# Patient Record
Sex: MALE | Race: WHITE | NOT HISPANIC OR LATINO | Employment: STUDENT | URBAN - METROPOLITAN AREA
[De-identification: names, ages, dates, MRNs, and addresses within clinical notes are randomized per-mention and may not be internally consistent; named-entity substitution may affect disease eponyms.]

---

## 2022-09-27 ENCOUNTER — OFFICE VISIT (OUTPATIENT)
Dept: FAMILY MEDICINE CLINIC | Facility: CLINIC | Age: 22
End: 2022-09-27

## 2022-09-27 VITALS
HEART RATE: 82 BPM | TEMPERATURE: 98.6 F | WEIGHT: 170 LBS | BODY MASS INDEX: 23.03 KG/M2 | HEIGHT: 72 IN | SYSTOLIC BLOOD PRESSURE: 118 MMHG | OXYGEN SATURATION: 98 % | DIASTOLIC BLOOD PRESSURE: 70 MMHG

## 2022-09-27 DIAGNOSIS — Z51.81 THERAPEUTIC DRUG MONITORING: ICD-10-CM

## 2022-09-27 DIAGNOSIS — F90.9 ATTENTION DEFICIT HYPERACTIVITY DISORDER (ADHD), UNSPECIFIED ADHD TYPE: Primary | ICD-10-CM

## 2022-09-27 PROCEDURE — 99204 OFFICE O/P NEW MOD 45 MIN: CPT | Performed by: FAMILY MEDICINE

## 2022-09-27 NOTE — PROGRESS NOTES
New Patient Office Visit      Patient Name: Avery Coffman  : 2000   MRN: [id number]     Chief Complaint:    Chief Complaint   Patient presents with   • ADHD       History of Present Illness: Avery Coffman is a 22 y.o. male who is here today to establish care.  adhd is is main concern. Was on higher does 40mg and 30mg. Now on 20mg. Diagnosed at age 17. 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     Patient has been off Vyvanse for 3 months then he says school is fairly tough without it.  He has trouble focusing.  He like to restart the 20 mg.    Review of systems was negative for shortness of breath and chest pain      Physical exam: Patient's mood and affect was appropriate.  Lung exam CTA bilaterally.  Heart exam RRR.        Subjective          Past Medical History: History reviewed. No pertinent past medical history.    Past Surgical History: History reviewed. No pertinent surgical history.    Family History:   Family History   Problem Relation Age of Onset   • Cancer Mother    • Thyroid disease Mother    • Asthma Sister    • Diabetes Maternal Grandmother    • Hearing loss Maternal Grandfather    • Hypertension Paternal Grandfather    • Hyperlipidemia Paternal Grandfather    • Heart disease Paternal Grandfather        Social History:   Social History     Socioeconomic History   • Marital status: Single   Tobacco Use   • Smoking status: Current Some Day Smoker     Packs/day: 0.25     Years: 6.00     Pack years: 1.50     Types: Cigarettes   • Smokeless tobacco: Never Used   Substance and Sexual Activity   • Alcohol use: Yes     Alcohol/week: 8.0 standard drinks     Types: 4 Glasses of wine, 4 Shots of liquor per week   • Drug use: Never   • Sexual activity: Yes     Partners: Female       Medications:     Current Outpatient Medications:   •  lisdexamfetamine (Vyvanse) 20 MG capsule, Take 1 capsule by mouth Every Morning Call provider for refills, Disp: 30 capsule, Rfl: 0    Allergies:   Allergies   Allergen  "Reactions   • Dairy-Digestive [Lactase] GI Intolerance   • Gluten Meal GI Intolerance       Objective     Physical Exam: Please see above  Vital Signs:   Vitals:    09/27/22 1345   BP: 118/70   Pulse: 82   Temp: 98.6 °F (37 °C)   SpO2: 98%   Weight: 77.1 kg (170 lb)   Height: 182.9 cm (72\")     Body mass index is 23.06 kg/m².       Assessment / Plan      Assessment/Plan:   Diagnoses and all orders for this visit:    1. Attention deficit hyperactivity disorder (ADHD), unspecified ADHD type (Primary)  -     lisdexamfetamine (Vyvanse) 20 MG capsule; Take 1 capsule by mouth Every Morning Call provider for refills  Dispense: 30 capsule; Refill: 0    2. Therapeutic drug monitoring  -     Compliance Drug Analysis, Ur - Urine, Clean Catch         1. Will restart Vyvanse at 20 mg.  Patient reportedly been on it for 5 years.  Has stopped it within the last 3 to 4 months.  Have worsening symptoms so we will restart.  Discussed her contract,.  Did YAMILETH Moy reviewed.      Follow-up every 3 months and annual exam will be in 9 months    Follow Up:   Return in about 3 months (around 12/27/2022) for Recheck.    Edwin Mccall,   [facility] [place]     "

## 2022-10-04 LAB — DRUGS UR: NORMAL

## 2023-04-06 DIAGNOSIS — F90.9 ATTENTION DEFICIT HYPERACTIVITY DISORDER (ADHD), UNSPECIFIED ADHD TYPE: ICD-10-CM

## 2023-04-06 NOTE — TELEPHONE ENCOUNTER
Caller: Avery Coffman    Relationship: Self    Best call back number: [phone]    Requested Prescriptions:   Requested Prescriptions     Pending Prescriptions Disp Refills   • lisdexamfetamine (Vyvanse) 20 MG capsule 30 capsule 0     Sig: Take 1 capsule by mouth Every Morning Call provider for refills      Pharmacy where request should be sent: [facility] [id number] [address]  AT [facility] & [facility] [address] [place] [address] - [phone]  - [phone] FX     Last office visit with prescribing clinician: 9/27/2022   Last telemedicine visit with prescribing clinician: Visit date not found   Next office visit with prescribing clinician: Visit date not found     Does the patient have less than a 3 day supply:  [x] Yes  [] No    Would you like a call back once the refill request has been completed: [x] Yes [] No    If the office needs to give you a call back, can they leave a voicemail: [x] Yes [] No    Westley Velez Rep   04/06/23 10:15 EDT

## 2023-04-06 NOTE — TELEPHONE ENCOUNTER
Vyvanse is a controlled substance.  Per state law, it is required that you follow-up every 3 months for refills.  For any further refills, please follow-up with our office.

## 2023-04-07 NOTE — TELEPHONE ENCOUNTER
HUB TO READ    CONTACTED PATIENT TO LET HIM KNOW AN APPOINTMENT WILL NEED TO BE MADE IN ORDER TO GET REFILLS.  PATIENT DID NOW ANSWER AND VOICEMAIL WAS NOT SET UP.

## 2023-04-10 ENCOUNTER — OFFICE VISIT (OUTPATIENT)
Dept: FAMILY MEDICINE CLINIC | Facility: CLINIC | Age: 23
End: 2023-04-10
Payer: COMMERCIAL

## 2023-04-10 VITALS
BODY MASS INDEX: 23.7 KG/M2 | TEMPERATURE: 98.6 F | DIASTOLIC BLOOD PRESSURE: 70 MMHG | HEART RATE: 85 BPM | WEIGHT: 175 LBS | OXYGEN SATURATION: 99 % | SYSTOLIC BLOOD PRESSURE: 114 MMHG | HEIGHT: 72 IN

## 2023-04-10 DIAGNOSIS — F90.9 ATTENTION DEFICIT HYPERACTIVITY DISORDER (ADHD), UNSPECIFIED ADHD TYPE: Primary | ICD-10-CM

## 2023-04-10 DIAGNOSIS — Z51.81 THERAPEUTIC DRUG MONITORING: ICD-10-CM

## 2023-04-10 NOTE — PROGRESS NOTES
"     Follow Up Office Visit      Patient Name: Avery Coffman  : 2000   MRN: [id number]     Chief Complaint:    Chief Complaint   Patient presents with   • ADHD       History of Present Illness: Avery Coffman is a 22 y.o. male who is here today to follow up with ADHD.  Patient reports following Ritalin use when he was around age 16.  He has been diagnosed with ADHD for a while.  Patient says that he been on Vyvanse mostly.  Patient says that this has worked well for him.  He says he had side effects with the higher dose of Vyvanse.  He is currently studying homeland security.    Patient wants to restart his Vyvanse because he is back in school having trouble concentrating.  Patient says that the reason he stopped it last Blanca was that he was not taking classes.    Review of systems positive for inattention    Physical exam: Patient's mood and affect is appropriate      Subjective        I have reviewed and the following portions of the patient's history were updated as appropriate: past family history, past medical history, past social history, past surgical history and problem list.    Medications:     Current Outpatient Medications:   •  lisdexamfetamine (Vyvanse) 20 MG capsule, Take 1 capsule by mouth Every Morning, Disp: 30 capsule, Rfl: 0    Allergies:   Allergies   Allergen Reactions   • Dairy-Digestive [Tilactase] GI Intolerance   • Gluten Meal GI Intolerance       Objective     Physical Exam: Please see above  Vital Signs:   Vitals:    04/10/23 1306   BP: 114/70   Pulse: 85   Temp: 98.6 °F (37 °C)   SpO2: 99%   Weight: 79.4 kg (175 lb)   Height: 182.9 cm (72\")     Body mass index is 23.73 kg/m².          Assessment / Plan      Assessment/Plan:   Diagnoses and all orders for this visit:    1. Attention deficit hyperactivity disorder (ADHD), unspecified ADHD type (Primary)  -     lisdexamfetamine (Vyvanse) 20 MG capsule; Take 1 capsule by mouth Every Morning  Dispense: 30 capsule; Refill: 0    2. " Therapeutic drug monitoring  -     Compliance Drug Analysis, Ur - Urine, Clean Catch    Patient reporting previous failure of Ritalin in his remote past.  Unsure of dates used.  Patient reporting history of Vyvanse use, this is continuation of treatment.  Restart Vyvanse 20 mg.  Increase as needed.  Follow-up monthly for medication changes and every 3 months for follow-up otherwise.  Drug contract and UDS discussed today    Today we reviewed and discussed the patient's controlled substance.  We reviewed their [place] report, previous drug screens, and drug contract.  They have a drug contract and drug screen on file that is current.  They are compliant with follow-ups every 3 months, and will continue to be compliant per the drug contract.    Follow-up in 3 months for refills or in 1 month to discuss medication changes needed    Follow Up:   Return in about 3 months (around 7/10/2023) for Recheck.    Edwin Mccall DO  [facility] [place] [facility] [place]

## 2023-04-17 LAB — DRUGS UR: NORMAL

## 2023-05-08 DIAGNOSIS — F90.9 ATTENTION DEFICIT HYPERACTIVITY DISORDER (ADHD), UNSPECIFIED ADHD TYPE: ICD-10-CM

## 2023-05-08 NOTE — TELEPHONE ENCOUNTER
Caller: Avery Coffman    Relationship: Self    Best call back number: [phone]    Requested Prescriptions:   Requested Prescriptions     Pending Prescriptions Disp Refills   • lisdexamfetamine (Vyvanse) 20 MG capsule 30 capsule 0     Sig: Take 1 capsule by mouth Every Morning        Pharmacy where request should be sent: [facility] [id number] [address]  AT [facility] & [facility] [place] [address] - [phone]  - [phone] FX     Last office visit with prescribing clinician: 4/10/2023   Last telemedicine visit with prescribing clinician: 6/12/2023   Next office visit with prescribing clinician: 6/12/2023     Additional details provided by patient: PATIENT HAS 2 LEFT. PATIENT IS GOING OUT OF TOWN EARLY TOMORROW. IF POSSIBLE PATIENT WOULD LIKE THIS FILLED TODAY 05.08.23.     Does the patient have less than a 3 day supply:  [x] Yes  [] No    Would you like a call back once the refill request has been completed: [x] Yes [] No    If the office needs to give you a call back, can they leave a voicemail: [] Yes [] No    Westley Hannah Rep   05/08/23 09:45 EDT

## 2023-06-12 ENCOUNTER — TELEPHONE (OUTPATIENT)
Dept: FAMILY MEDICINE CLINIC | Facility: CLINIC | Age: 23
End: 2023-06-12

## 2023-06-15 ENCOUNTER — OFFICE VISIT (OUTPATIENT)
Dept: FAMILY MEDICINE CLINIC | Facility: CLINIC | Age: 23
End: 2023-06-15
Payer: COMMERCIAL

## 2023-06-15 VITALS
HEIGHT: 72 IN | BODY MASS INDEX: 22.75 KG/M2 | TEMPERATURE: 98.2 F | HEART RATE: 55 BPM | WEIGHT: 168 LBS | SYSTOLIC BLOOD PRESSURE: 112 MMHG | DIASTOLIC BLOOD PRESSURE: 70 MMHG

## 2023-06-15 DIAGNOSIS — F90.9 ATTENTION DEFICIT HYPERACTIVITY DISORDER (ADHD), UNSPECIFIED ADHD TYPE: Primary | ICD-10-CM

## 2023-06-15 PROCEDURE — 99214 OFFICE O/P EST MOD 30 MIN: CPT | Performed by: FAMILY MEDICINE

## 2023-06-15 RX ORDER — DEXTROAMPHETAMINE SACCHARATE, AMPHETAMINE ASPARTATE MONOHYDRATE, DEXTROAMPHETAMINE SULFATE AND AMPHETAMINE SULFATE 7.5; 7.5; 7.5; 7.5 MG/1; MG/1; MG/1; MG/1
30 CAPSULE, EXTENDED RELEASE ORAL EVERY MORNING
Qty: 30 CAPSULE | Refills: 0 | Status: SHIPPED | OUTPATIENT
Start: 2023-06-15

## 2023-06-15 NOTE — PROGRESS NOTES
"     Follow Up Office Visit      Patient Name: Avery Coffman  : 2000   MRN: [id number]     Chief Complaint:    Chief Complaint   Patient presents with    ADHD       History of Present Illness: Avery Coffman is a 23 y.o. male who is here today to follow up with adhd.  Patient has been on Vyvanse before and says it is costing $80 per month.  First prescription was $50.  Patient reports his mother says this is fairly cheap compared to the original price of 400.  Patient has never had Adderall.  He has failed Ritalin.  Patient is willing to try Adderall as it may be a cheaper alternative.      Otherwise patient is doing very well on Vyvanse and just needs refill today.  We discussed switching him to Adderall for pricing      Physical exam: Patient's mood and affect is appropriate      Subjective        I have reviewed and the following portions of the patient's history were updated as appropriate: past family history, past medical history, past social history, past surgical history and problem list.    Medications:     Current Outpatient Medications:     amphetamine-dextroamphetamine XR (Adderall XR) 30 MG 24 hr capsule, Take 1 capsule by mouth Every Morning, Disp: 30 capsule, Rfl: 0    Allergies:   Allergies   Allergen Reactions    Dairy-Digestive [Tilactase] GI Intolerance    Gluten Meal GI Intolerance       Objective     Physical Exam: Please see above  Vital Signs:   Vitals:    06/15/23 1014   BP: 112/70   Pulse: 55   Temp: 98.2 °F (36.8 °C)   Weight: 76.2 kg (168 lb)   Height: 182.9 cm (72\")     Body mass index is 22.78 kg/m².          Assessment / Plan      Assessment/Plan:   Diagnoses and all orders for this visit:    1. Attention deficit hyperactivity disorder (ADHD), unspecified ADHD type (Primary)  -     amphetamine-dextroamphetamine XR (Adderall XR) 30 MG 24 hr capsule; Take 1 capsule by mouth Every Morning  Dispense: 30 capsule; Refill: 0    Stop Vyvanse and start Adderall.  Follow-up in 1 month " for recheck.  Follow-up in 3 months for annual exam.      Today we reviewed and discussed the patient's controlled substance.  We reviewed their [place] report, previous drug screens, and drug contract.  They have a drug contract and drug screen on file that is current.  They are compliant with follow-ups every 3 months, and will continue to be compliant per the drug contract.     Follow Up:   Return for Annual, Recheck.    Edwin Mccall DO  [facility] [place] [place]

## 2023-06-19 ENCOUNTER — TELEPHONE (OUTPATIENT)
Dept: FAMILY MEDICINE CLINIC | Facility: CLINIC | Age: 23
End: 2023-06-19

## 2023-06-19 NOTE — TELEPHONE ENCOUNTER
Caller: Avery Coffman    Relationship: Self    Best call back number: [phone]     What is the best time to reach you: ANYTIME    Who are you requesting to speak with (clinical staff, provider,  specific staff member): DR GIL    What was the call regarding: PATIENT IS HAVING PRESCRIPTION ISSUES

## 2023-07-25 ENCOUNTER — TELEPHONE (OUTPATIENT)
Dept: FAMILY MEDICINE CLINIC | Facility: CLINIC | Age: 23
End: 2023-07-25

## 2023-07-25 NOTE — TELEPHONE ENCOUNTER
Caller: Avery Coffman    Relationship: Self    Best call back number: [phone]    What medications are you currently taking:   Current Outpatient Medications on File Prior to Visit   Medication Sig Dispense Refill    lisdexamfetamine (Vyvanse) 20 MG capsule Take 1 capsule by mouth Every Morning Call provider for refills 30 capsule 0     No current facility-administered medications on file prior to visit.          Which medication are you concerned about: VMANOHAR    Who prescribed you this medication: DR GIL    What are your concerns: PATIENT STATES THAT EVEN THOUGH THE REFILL WAS SENT TO PHARMACY HE STILL NEEDS A WRITTEN PRESCRIPTION TO TAKE TO PHARMACY IN ORDER TO  MEDICATION. PLEASE ADVISE

## 2023-07-25 NOTE — TELEPHONE ENCOUNTER
I called and spoke with the pharamcy and they do not need a written rx. I called the pt and advised him. He voiced understanding.

## 2023-10-30 ENCOUNTER — TELEPHONE (OUTPATIENT)
Dept: FAMILY MEDICINE CLINIC | Facility: CLINIC | Age: 23
End: 2023-10-30

## 2024-09-04 ENCOUNTER — OFFICE VISIT (OUTPATIENT)
Dept: FAMILY MEDICINE CLINIC | Facility: CLINIC | Age: 24
End: 2024-09-04
Payer: COMMERCIAL

## 2024-09-04 VITALS
RESPIRATION RATE: 19 BRPM | TEMPERATURE: 98 F | HEART RATE: 71 BPM | WEIGHT: 168 LBS | HEIGHT: 71 IN | BODY MASS INDEX: 23.52 KG/M2 | OXYGEN SATURATION: 98 % | SYSTOLIC BLOOD PRESSURE: 100 MMHG | DIASTOLIC BLOOD PRESSURE: 66 MMHG

## 2024-09-04 DIAGNOSIS — Z79.899 HIGH RISK MEDICATION USE: Primary | ICD-10-CM

## 2024-09-04 PROCEDURE — 99213 OFFICE O/P EST LOW 20 MIN: CPT | Performed by: NURSE PRACTITIONER

## 2024-09-04 NOTE — PROGRESS NOTES
Office Note     Name: Avery Coffman    : 2000     MRN: [id number]     Chief Complaint  ADHD    Subjective     History of Present Illness:  Avery Coffman is a 24 y.o. male who presents today for a refill of his Vyvanse. He reports a diagnosis of ADHD. He states he is doing well on the Vyvanse, has increased concentration. He denies chest pain, shortness of breath, irregular or fast heart rate. He is sleeping well. No significant weight loss. He is currently attending college and the Vyvanse helps him focus on his school work. He will graduate this coming year. The patient requests a refill of the Vyvanse.      Review of Systems:   Review of Systems    Past Medical History: History reviewed. No pertinent past medical history.    Past Surgical History: History reviewed. No pertinent surgical history.    Immunizations:   Immunization History   Administered Date(s) Administered    COVID-19 (PFIZER) Purple Cap Monovalent 2022    Covid-19 (Pfizer) Gray Cap Monovalent 2022    DTaP, Unspecified 2000, 2000, 2000, 2001, 2004    Hep A, 2 Dose 2010, 2011    Hep B, Unspecified 2000, 2000, 2000    Hepatitis A Pediatric Unspecified 2010, 2011    HiB 2000, 2000, 2000, 2001    IPV 2000, 2000, 2001, 2004    MMR 2001, 2004    Meningococcal, Unspecified 2011, 2016    PPD Test 2001, 2003, 2004, 2005, 06/15/2007, 2008, 2009    Pneumococcal, Unspecified 2000, 2000, 2001, 2001    Tdap 2011, 02/10/2022    Varicella 2001, 2010        Medications:     Current Outpatient Medications:     lisdexamfetamine (Vyvanse) 20 MG capsule, Take 1 capsule by mouth Every Morning Call provider for refills, Disp: 30 capsule, Rfl: 0    Allergies:   Allergies   Allergen Reactions    Dairy-Digestive [Tilactase] GI  "Intolerance    Gluten Meal GI Intolerance       Family History:   Family History   Problem Relation Age of Onset    Cancer Mother     Thyroid disease Mother     Asthma Sister     Diabetes Maternal Grandmother     Hearing loss Maternal Grandfather     Hypertension Paternal Grandfather     Hyperlipidemia Paternal Grandfather     Heart disease Paternal Grandfather        Social History:   Social History     Socioeconomic History    Marital status: Single   Tobacco Use    Smoking status: Some Days     Current packs/day: 0.25     Average packs/day: 0.3 packs/day for 6.0 years (1.5 ttl pk-yrs)     Types: Cigarettes     Passive exposure: Current    Smokeless tobacco: Never   Vaping Use    Vaping status: Some Days    Substances: Nicotine, Flavoring    Devices: Disposable    Passive vaping exposure: Yes   Substance and Sexual Activity    Alcohol use: Yes     Alcohol/week: 8.0 standard drinks of alcohol     Types: 4 Glasses of wine, 4 Shots of liquor per week    Drug use: Never    Sexual activity: Yes     Partners: Female         Objective     Vital Signs  /66 (BP Location: Right arm, Patient Position: Sitting, Cuff Size: Adult)   Pulse 71   Temp 98 °F (36.7 °C) (Infrared)   Resp 19   Ht 179.7 cm (70.75\")   Wt 76.2 kg (168 lb)   SpO2 98%   BMI 23.60 kg/m²   Estimated body mass index is 23.6 kg/m² as calculated from the following:    Height as of this encounter: 179.7 cm (70.75\").    Weight as of this encounter: 76.2 kg (168 lb).    BMI is within normal parameters. No other follow-up for BMI required.      Physical Exam  Vitals and nursing note reviewed.   Constitutional:       General: He is not in acute distress.     Appearance: Normal appearance. He is not ill-appearing or toxic-appearing.   HENT:      Head: Normocephalic and atraumatic.   Cardiovascular:      Rate and Rhythm: Normal rate and regular rhythm.      Heart sounds: Normal heart sounds.   Pulmonary:      Effort: Pulmonary effort is normal.      " Breath sounds: Normal breath sounds.   Musculoskeletal:         General: Normal range of motion.      Cervical back: Normal range of motion and neck supple.   Skin:     General: Skin is warm.   Neurological:      General: No focal deficit present.      Mental Status: He is alert.   Psychiatric:         Mood and Affect: Mood normal.         Behavior: Behavior normal.         Thought Content: Thought content normal.         Judgment: Judgment normal.          Assessment and Plan     Procedures      Lab Results (last 72 hours)       ** No results found for the last 72 hours. **                  Diagnoses and all orders for this visit:    1. High risk medication use (Primary)  -     Cancel: Urine Drug Screen - Urine, Clean Catch; Future  -     Cancel: Urine Drug Screen - Urine, Clean Catch  -     Compliance Drug Analysis, Ur - Urine, Clean Catch; Future  -     Compliance Drug Analysis, Ur - Urine, Clean Catch    Requests for medication refill sent to Dr. Mccall. Routine urine drug screen obtained during clinic visit. Patient instructed to schedule an appointment in three months with Dr. Mccall.        Follow Up  Return in about 3 months (around 12/4/2024).    ELO Alfaro [facility]  [address] [place] [id number] [phone]

## 2024-09-04 NOTE — Clinical Note
The patient requests a refill of Vyvanse. I did a urine drug screen, awaiting results. I instructed him to schedule an appointment in three months.

## 2024-09-05 DIAGNOSIS — F90.9 ATTENTION DEFICIT HYPERACTIVITY DISORDER (ADHD), UNSPECIFIED ADHD TYPE: ICD-10-CM

## 2024-09-05 RX ORDER — LISDEXAMFETAMINE DIMESYLATE 20 MG/1
20 CAPSULE ORAL EVERY MORNING
Qty: 30 CAPSULE | Refills: 0 | Status: SHIPPED | OUTPATIENT
Start: 2024-09-05

## 2024-09-05 NOTE — TELEPHONE ENCOUNTER
Caller: Avery Coffman    Relationship: Self    Best call back number: [phone]     Requested Prescriptions:   Requested Prescriptions     Pending Prescriptions Disp Refills    lisdexamfetamine (Vyvanse) 20 MG capsule 30 capsule 0     Sig: Take 1 capsule by mouth Every Morning Call provider for refills        Pharmacy where request should be sent: [facility] [id number] [address]  AT [facility] & [facility] [place] [address] - [phone]  - [phone] FX     Last office visit with prescribing clinician: 6/15/2023   Last telemedicine visit with prescribing clinician: Visit date not found   Next office visit with prescribing clinician: 12/6/2024     Additional details provided by patient: PATIENT STATED THAT HE WAS SEEN ON 9/4/24 AND THIS WAS NOT CALLED IN AT HIS APPOINTMENT     Does the patient have less than a 3 day supply:  [x] Yes  [] No    Would you like a call back once the refill request has been completed: [x] Yes [] No    If the office needs to give you a call back, can they leave a voicemail: [x] Yes [] No    Westley Young Rep   09/05/24 11:29 EDT

## 2024-09-10 LAB — DRUGS UR: NORMAL

## 2024-10-08 DIAGNOSIS — F90.9 ATTENTION DEFICIT HYPERACTIVITY DISORDER (ADHD), UNSPECIFIED ADHD TYPE: ICD-10-CM

## 2024-10-08 RX ORDER — LISDEXAMFETAMINE DIMESYLATE 20 MG/1
20 CAPSULE ORAL EVERY MORNING
Qty: 30 CAPSULE | Refills: 0 | Status: CANCELLED | OUTPATIENT
Start: 2024-10-08

## 2024-10-08 RX ORDER — LISDEXAMFETAMINE DIMESYLATE 20 MG/1
20 CAPSULE ORAL EVERY MORNING
Qty: 30 CAPSULE | Refills: 0 | Status: SHIPPED | OUTPATIENT
Start: 2024-10-08

## 2024-10-08 NOTE — TELEPHONE ENCOUNTER
Caller: Avery Coffman    Relationship: Self    Best call back number:       [phone] (Home)     Requested Prescriptions:   Requested Prescriptions     Pending Prescriptions Disp Refills    lisdexamfetamine (Vyvanse) 20 MG capsule 30 capsule 0     Sig: Take 1 capsule by mouth Every Morning Call provider for refills      Pharmacy where request should be sent:    [facility] [id number] [address]  AT [facility] & [facility] [place] [address] - [phone]  - [phone] FX     Last office visit with prescribing clinician: 6/15/2023   Last telemedicine visit with prescribing clinician: Visit date not found   Next office visit with prescribing clinician: 12/6/2024     Additional details provided by patient:     PATIENT STATED HE HAS (2) DAY SUPPLY LEFT OF MEDICATION    Does the patient have less than a 3 day supply:  [x] Yes  [] No    Would you like a call back once the refill request has been completed: [] Yes [] No    If the office needs to give you a call back, can they leave a voicemail: [] Yes [] No    Westley Mcconnell Rep   10/08/24 15:15 EDT

## 2024-11-14 DIAGNOSIS — F90.9 ATTENTION DEFICIT HYPERACTIVITY DISORDER (ADHD), UNSPECIFIED ADHD TYPE: ICD-10-CM

## 2024-11-14 RX ORDER — LISDEXAMFETAMINE DIMESYLATE 20 MG/1
20 CAPSULE ORAL EVERY MORNING
Qty: 30 CAPSULE | Refills: 0 | Status: SHIPPED | OUTPATIENT
Start: 2024-11-14

## 2024-11-14 NOTE — TELEPHONE ENCOUNTER
Caller: Avery Coffman    Relationship: Self    Best call back number: [phone] (home)     Requested Prescriptions:   Requested Prescriptions     Pending Prescriptions Disp Refills    lisdexamfetamine (Vyvanse) 20 MG capsule 30 capsule 0     Sig: Take 1 capsule by mouth Every Morning Call provider for refills        Pharmacy where request should be sent: [facility] [id number] [address]  AT [facility] & [facility] [place] [address] - [phone]  - [phone] FX     Last office visit with prescribing clinician: 6/15/2023   Last telemedicine visit with prescribing clinician: Visit date not found   Next office visit with prescribing clinician: 12/6/2024     Additional details provided by patient:       Westley Lovelace Rep   11/14/24 10:14 EST

## 2024-11-19 ENCOUNTER — OFFICE VISIT (OUTPATIENT)
Dept: ORTHOPEDIC SURGERY | Facility: CLINIC | Age: 24
End: 2024-11-19
Payer: COMMERCIAL

## 2024-11-19 ENCOUNTER — TELEPHONE (OUTPATIENT)
Dept: ORTHOPEDIC SURGERY | Facility: CLINIC | Age: 24
End: 2024-11-19

## 2024-11-19 VITALS
HEIGHT: 70 IN | DIASTOLIC BLOOD PRESSURE: 68 MMHG | BODY MASS INDEX: 23.48 KG/M2 | WEIGHT: 164 LBS | SYSTOLIC BLOOD PRESSURE: 102 MMHG

## 2024-11-19 DIAGNOSIS — S49.91XA INJURY OF RIGHT SHOULDER, INITIAL ENCOUNTER: ICD-10-CM

## 2024-11-19 DIAGNOSIS — S43.101A SEPARATION OF RIGHT ACROMIOCLAVICULAR JOINT, INITIAL ENCOUNTER: Primary | ICD-10-CM

## 2024-11-19 PROCEDURE — 99204 OFFICE O/P NEW MOD 45 MIN: CPT | Performed by: ORTHOPAEDIC SURGERY

## 2024-11-19 NOTE — PROGRESS NOTES
[facility] [place] [facility] Note        Subjective     Pain and Initial Evaluation of the Right Shoulder      HPI    Avery Coffman is a 24 y.o. male.  Patient is here for second opinion today regarding his right shoulder.  He says in May 2024, he was running and tripped and fell and landed directly on his right shoulder.  He did not seek treatment until October 2024 when he started to lift weights and get himself in shape and he noticed that he was having difficulty reaching across his body and doing push-ups.  He says his right shoulder strength is estimated about 20% of normal.  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  His goal is to work for the department of [employer] and [employer] once he graduates.  Patient saw Dr. Otilio Sanchez at  initially who recommended nonoperative treatment.        History reviewed. No pertinent past medical history.   History reviewed. No pertinent surgical history.   Family History   Problem Relation Age of Onset    Cancer Mother     Thyroid disease Mother     Asthma Sister     Diabetes Maternal Grandmother     Hearing loss Maternal Grandfather     Hypertension Paternal Grandfather     Hyperlipidemia Paternal Grandfather     Heart disease Paternal Grandfather      Social History     Socioeconomic History    Marital status: Single   Tobacco Use    Smoking status: Some Days     Current packs/day: 0.25     Average packs/day: 0.3 packs/day for 6.0 years (1.5 ttl pk-yrs)     Types: Cigarettes     Passive exposure: Current    Smokeless tobacco: Never   Vaping Use    Vaping status: Some Days    Substances: Nicotine, Flavoring    Devices: Disposable    Passive vaping exposure: Yes   Substance and Sexual Activity    Alcohol use: Yes     Alcohol/week: 8.0 standard drinks of alcohol     Types: 4 Glasses of wine, 4 Shots of liquor per week    Drug use: Never    Sexual activity: Yes     Partners: Female      Current Outpatient  "Medications on File Prior to Visit   Medication Sig Dispense Refill    lisdexamfetamine (Vyvanse) 20 MG capsule Take 1 capsule by mouth Every Morning Call provider for refills 30 capsule 0     No current facility-administered medications on file prior to visit.      Allergies   Allergen Reactions    Dairy-Digestive [Tilactase] GI Intolerance    Gluten Meal GI Intolerance          Review of Systems   Constitutional: Negative.    HENT: Negative.     Eyes: Negative.    Respiratory: Negative.     Cardiovascular: Negative.    Gastrointestinal: Negative.    Endocrine: Negative.    Genitourinary: Negative.    Musculoskeletal:  Positive for arthralgias.   Skin: Negative.    Allergic/Immunologic: Negative.    Neurological: Negative.    Hematological: Negative.    Psychiatric/Behavioral: Negative.          I reviewed the patient's chief complaint, history of present illness, review of systems, past medical history, surgical history, family history, social history, medications and allergy list.        Objective      Physical Exam  /68   Ht 179 cm (70.47\")   Wt 74.4 kg (164 lb)   BMI 23.22 kg/m²     Body mass index is 23.22 kg/m².    General  Mental Status - alert  General Appearance - cooperative, well groomed, not in acute distress  Orientation - Oriented X3  Build & Nutrition - well developed and well nourished  Posture - normal posture  Gait - normal gait       Ortho Exam  Musculoskeletal   Upper Extremity   Right Shoulder     Inspection and Palpation:     Medial border scapular tenderness-none    AC Joint Tenderness -mild to moderate    Sensation is normal    Examination reveals no ecchymosis.        Strength and Tone:    Supraspinatus - 5/5 with mild pain    External Rotators-5/5    Infraspinatus - 5/5    Subscapularis - 5/5    Deltoid - 5/5     Range of Motion      RightShoulder:    Internal Rotation: ROM - L4    External Rotation: AROM - 60 degrees    Elevation through flexion: AROM - 140 degrees "        Impingement   Right shoulder    Benítez-Landon impingement test positive    Neer impingement test negative     Functional Testing   Right shoulder    AC crossover adduction test positive    Speeds test negative    Uppercut test negative    O'Briens test negative    Drop arm sign negative    Apprehension relocation negative      Imaging/Studies Reviewed and Interpreted:  Imaging Results (Last 24 Hours)       ** No results found for the last 24 hours. **            We have reviewed and interpreted an x-ray of the patient's right shoulder from [facility] from 10/31/2024.  Patient has changes at the AC joint consistent with a grade 3 AC separation.      Assessment    Assessment:  1. Separation of right acromioclavicular joint, initial encounter    2. Injury of right shoulder, initial encounter        Plan:  Continue over-the-counter medication as needed for discomfort  Right shoulder injury with grade 3 AC separation--we have had a lengthy discussion with the patient and his mother who is accompanying him today.  I agree with Dr. Sanchez that in the vast majority of cases, nonoperative treatment can be used to manage this injury.  I would recommend nonoperative treatment here.  He is doing physical therapy at [facility] in the [place] area which is wonderful and he has only had 1 visit.  I recommended about 6 weeks of PT and strengthening and hopefully he will see his pain diminished and his strength and function improved.  If he is not dramatically better in 6 weeks after PT, MR arthrogram can be considered.  Hopefully this will not be necessary.        Hernan Colorado MD  11/19/24  15:25 EST      Dictated Utilizing Dragon Dictation.

## 2024-11-20 NOTE — TELEPHONE ENCOUNTER
Created work note, and called patient to advise that it is printed and in the front office accordion. Could not leave voicemail.

## 2024-11-20 NOTE — TELEPHONE ENCOUNTER
It would be ideal if he could avoid lifting >30 lbs and avoid overhead activity with his RUE.    Lo CARDONA

## 2024-12-06 ENCOUNTER — TELEPHONE (OUTPATIENT)
Dept: FAMILY MEDICINE CLINIC | Facility: CLINIC | Age: 24
End: 2024-12-06

## 2024-12-17 DIAGNOSIS — F90.9 ATTENTION DEFICIT HYPERACTIVITY DISORDER (ADHD), UNSPECIFIED ADHD TYPE: ICD-10-CM

## 2024-12-17 RX ORDER — LISDEXAMFETAMINE DIMESYLATE 20 MG/1
20 CAPSULE ORAL EVERY MORNING
Qty: 30 CAPSULE | Refills: 0 | OUTPATIENT
Start: 2024-12-17

## 2024-12-17 NOTE — TELEPHONE ENCOUNTER
Caller: Avery Coffman    Relationship: Self    Best call back number: [phone]     Requested Prescriptions:   Requested Prescriptions     Pending Prescriptions Disp Refills    lisdexamfetamine (Vyvanse) 20 MG capsule 30 capsule 0     Sig: Take 1 capsule by mouth Every Morning Call provider for refills        Pharmacy where request should be sent: [facility] [id number] [address]  AT [facility] [address] - [phone]  - [phone] FX     Last office visit with prescribing clinician: 6/15/2023   Last telemedicine visit with prescribing clinician: Visit date not found   Next office visit with prescribing clinician: Visit date not found     Additional details provided by patient: PATIENT HAS 2 DAYS LEFT OF THIS MEDICATION.    Does the patient have less than a 3 day supply:  [x] Yes  [] No    Would you like a call back once the refill request has been completed: [] Yes [x] No    If the office needs to give you a call back, can they leave a voicemail: [] Yes [x] No    Westley Concepcion Rep   12/17/24 10:53 EST

## 2024-12-19 ENCOUNTER — OFFICE VISIT (OUTPATIENT)
Dept: FAMILY MEDICINE CLINIC | Facility: CLINIC | Age: 24
End: 2024-12-19
Payer: COMMERCIAL

## 2024-12-19 VITALS
SYSTOLIC BLOOD PRESSURE: 122 MMHG | BODY MASS INDEX: 23.57 KG/M2 | HEIGHT: 71 IN | TEMPERATURE: 98.6 F | WEIGHT: 168.4 LBS | DIASTOLIC BLOOD PRESSURE: 76 MMHG | OXYGEN SATURATION: 97 % | HEART RATE: 87 BPM

## 2024-12-19 DIAGNOSIS — Z51.81 THERAPEUTIC DRUG MONITORING: ICD-10-CM

## 2024-12-19 DIAGNOSIS — F90.9 ATTENTION DEFICIT HYPERACTIVITY DISORDER (ADHD), UNSPECIFIED ADHD TYPE: Primary | ICD-10-CM

## 2024-12-19 PROCEDURE — 99214 OFFICE O/P EST MOD 30 MIN: CPT | Performed by: FAMILY MEDICINE

## 2024-12-19 RX ORDER — LISDEXAMFETAMINE DIMESYLATE 20 MG/1
20 CAPSULE ORAL EVERY MORNING
Qty: 30 CAPSULE | Refills: 0 | Status: SHIPPED | OUTPATIENT
Start: 2024-12-19

## 2024-12-19 NOTE — PROGRESS NOTES
"     Follow Up Office Visit      Patient Name: Avery Coffman  : 2000   MRN: [id number]     Chief Complaint:    Chief Complaint   Patient presents with    ADHD       History of Present Illness: Avery Coffman is a 24 y.o. male who is here today to follow up with ADHD symptoms which are controlled on Vyvanse.  He reports compliance to medication and says it helps with his symptoms.  Currently able to stay on task and completing ADLs.      Denies any side effects.  Well and sleeping well      Physical exam: Patient's mood and affect was appropriate      Subjective        I have reviewed and the following portions of the patient's history were updated as appropriate: past family history, past medical history, past social history, past surgical history and problem list.    Medications:     Current Outpatient Medications:     lisdexamfetamine (Vyvanse) 20 MG capsule, Take 1 capsule by mouth Every Morning Call provider for refills, Disp: 30 capsule, Rfl: 0    Allergies:   Allergies   Allergen Reactions    Dairy-Digestive [Tilactase] GI Intolerance    Gluten Meal GI Intolerance       Objective     Physical Exam: Please see above  Vital Signs:   Vitals:    24 1109   BP: 122/76   Pulse: 87   Temp: 98.6 °F (37 °C)   TempSrc: Infrared   SpO2: 97%   Weight: 76.4 kg (168 lb 6.4 oz)   Height: 179.1 cm (70.5\")   PainSc: 0-No pain     Body mass index is 23.82 kg/m².          Assessment / Plan      Assessment/Plan:   Diagnoses and all orders for this visit:    1. Attention deficit hyperactivity disorder (ADHD), unspecified ADHD type (Primary)  -     lisdexamfetamine (Vyvanse) 20 MG capsule; Take 1 capsule by mouth Every Morning Call provider for refills  Dispense: 30 capsule; Refill: 0    2. Therapeutic drug monitoring    Discussed patient's symptoms and dosing today.  Will continue with current dose of Vyvanse 20 mg.  Refill sent in.  Call for refills monthly.  Follow-up in 3 months for drug screen contract    Today " we reviewed and discussed the patient's controlled substance.  We reviewed their [place] report, previous drug screens, and drug contract.  They have a drug contract and drug screen on file that is current.  They are compliant with follow-ups every 3 months, and will continue to be compliant per the drug contract.     Follow Up:   Return in about 3 months (around 3/19/2025) for Annual.    Edwin cMcall DO  [facility] [place]

## 2025-01-02 ENCOUNTER — OFFICE VISIT (OUTPATIENT)
Dept: ORTHOPEDIC SURGERY | Facility: CLINIC | Age: 25
End: 2025-01-02
Payer: COMMERCIAL

## 2025-01-02 VITALS
DIASTOLIC BLOOD PRESSURE: 96 MMHG | WEIGHT: 168.6 LBS | BODY MASS INDEX: 23.6 KG/M2 | HEIGHT: 71 IN | SYSTOLIC BLOOD PRESSURE: 140 MMHG

## 2025-01-02 DIAGNOSIS — S49.91XD INJURY OF RIGHT SHOULDER, SUBSEQUENT ENCOUNTER: ICD-10-CM

## 2025-01-02 DIAGNOSIS — S43.101D SEPARATION OF RIGHT ACROMIOCLAVICULAR JOINT, SUBSEQUENT ENCOUNTER: Primary | ICD-10-CM

## 2025-01-02 NOTE — PROGRESS NOTES
"    [facility] Note        Subjective     CC: Follow-up (7 week follow up---Separation of right acromioclavicular joint)      HPI    Avery Coffman is a 24 y.o. male.  Patient here today for follow-up of his grade 3 AC separation.  He is doing well overall.  Has been physical therapy at [facility] in [place].  Says he still has difficulty with push-ups but overall is getting stronger.    Overall, patient's symptoms are as above    ROS:    Constiutional:Pt denies fever, chills, nausea, or vomiting.  MSK:as above        Objective      Past Medical History  History reviewed. No pertinent past medical history.  Social History     Socioeconomic History    Marital status: Single   Tobacco Use    Smoking status: Some Days     Current packs/day: 0.25     Average packs/day: 0.3 packs/day for 6.0 years (1.5 ttl pk-yrs)     Types: Cigarettes     Passive exposure: Current    Smokeless tobacco: Never   Vaping Use    Vaping status: Some Days    Substances: Nicotine, Flavoring    Devices: Disposable    Passive vaping exposure: Yes   Substance and Sexual Activity    Alcohol use: Yes     Alcohol/week: 8.0 standard drinks of alcohol     Types: 4 Glasses of wine, 4 Shots of liquor per week    Drug use: Never    Sexual activity: Yes     Partners: Female          Physical Exam  /96   Ht 179.1 cm (70.51\")   Wt 76.5 kg (168 lb 9.6 oz)   BMI 23.84 kg/m²     Body mass index is 23.84 kg/m².    Patient is well nourished and well developed.        Ortho Exam  Musculoskeletal   Upper Extremity   Right Shoulder       Strength and Tone:    Supraspinatus -5 out of 5    External Rotators-5/5    Infraspinatus - 5/5    Subscapularis - 5/5    Deltoid - 5/5     Range of Motion      Right Shoulder:    Internal Rotation: ROM - L4    External Rotation: AROM - 70 degrees    Elevation through flexion: AROM - 140 degrees     AC joint:  non tender to palpation    AC joint:  negative crossover      Imaging/Labs/EMG " Reviewed and Interpreted:  Imaging Results (Last 24 Hours)       ** No results found for the last 24 hours. **              Assessment    Assessment:  1. Separation of right acromioclavicular joint, subsequent encounter    2. Injury of right shoulder, subsequent encounter        Plan:  Recommend over the counter anti-inflammatories for pain and/or swelling  Right AC separation, grade 3--patient should continue with physical therapy and strengthening.  We have cautioned him about certain exercises.  He needs to exercise caution with push-ups, bench press, and flies.  Recheck potentially for final check in about 6 to 8 weeks.      Hernan Colorado MD  01/02/25  13:10 EST      Dictated Utilizing Dragon Dictation.

## 2025-02-04 DIAGNOSIS — F90.9 ATTENTION DEFICIT HYPERACTIVITY DISORDER (ADHD), UNSPECIFIED ADHD TYPE: ICD-10-CM

## 2025-02-04 RX ORDER — LISDEXAMFETAMINE DIMESYLATE 20 MG/1
20 CAPSULE ORAL EVERY MORNING
Qty: 30 CAPSULE | Refills: 0 | Status: SHIPPED | OUTPATIENT
Start: 2025-02-04

## 2025-02-04 NOTE — TELEPHONE ENCOUNTER
Caller: Avery Coffman    Relationship: Self    Best call back number: [phone]     Requested Prescriptions:   Requested Prescriptions     Pending Prescriptions Disp Refills    lisdexamfetamine (Vyvanse) 20 MG capsule 30 capsule 0     Sig: Take 1 capsule by mouth Every Morning Call provider for refills        Pharmacy where request should be sent: [facility] [id number] [address]  AT [facility] [address] - [phone]  - [phone] FX     Last office visit with prescribing clinician: 12/19/2024   Last telemedicine visit with prescribing clinician: Visit date not found   Next office visit with prescribing clinician: Visit date not found     Does the patient have less than a 3 day supply:  [x] Yes  [] No    Would you like a call back once the refill request has been completed: [] Yes [x] No    If the office needs to give you a call back, can they leave a voicemail: [] Yes [x] No    Westley Castillo Rep   02/04/25 13:47 EST